# Patient Record
Sex: MALE
[De-identification: names, ages, dates, MRNs, and addresses within clinical notes are randomized per-mention and may not be internally consistent; named-entity substitution may affect disease eponyms.]

---

## 2022-05-11 PROBLEM — Z00.00 ENCOUNTER FOR PREVENTIVE HEALTH EXAMINATION: Status: ACTIVE | Noted: 2022-05-11

## 2022-05-12 ENCOUNTER — APPOINTMENT (OUTPATIENT)
Dept: SURGERY | Facility: CLINIC | Age: 67
End: 2022-05-12
Payer: MEDICARE

## 2022-05-12 VITALS
TEMPERATURE: 98.8 F | HEART RATE: 62 BPM | DIASTOLIC BLOOD PRESSURE: 77 MMHG | HEIGHT: 67 IN | SYSTOLIC BLOOD PRESSURE: 147 MMHG | BODY MASS INDEX: 27.94 KG/M2 | OXYGEN SATURATION: 96 % | WEIGHT: 178 LBS

## 2022-05-12 PROCEDURE — 99204 OFFICE O/P NEW MOD 45 MIN: CPT

## 2022-06-24 ENCOUNTER — APPOINTMENT (OUTPATIENT)
Dept: SURGERY | Facility: CLINIC | Age: 67
End: 2022-06-24
Payer: MEDICARE

## 2022-06-24 VITALS — HEART RATE: 59 BPM | DIASTOLIC BLOOD PRESSURE: 87 MMHG | SYSTOLIC BLOOD PRESSURE: 144 MMHG | OXYGEN SATURATION: 98 %

## 2022-06-24 VITALS
HEART RATE: 56 BPM | SYSTOLIC BLOOD PRESSURE: 152 MMHG | BODY MASS INDEX: 27.94 KG/M2 | TEMPERATURE: 98.2 F | OXYGEN SATURATION: 98 % | WEIGHT: 178 LBS | DIASTOLIC BLOOD PRESSURE: 79 MMHG | HEIGHT: 67 IN

## 2022-06-24 PROCEDURE — 11404 EXC TR-EXT B9+MARG 3.1-4 CM: CPT

## 2022-06-24 NOTE — PROCEDURE
[FreeTextEntry1] : Procedure: Excision of Back Mass\par Detail: After informed consent was obtained, the back was prepped and draped in sterile manner.  Using 1% lidocaine, the back was anesthetized.  Using a elliptical incision measuring 5 cm skin incision was made.  Mass with sebaceum inside was removed entirely.  Upon obtaining hemostasis, closure was begun.  Using 2-0 Vicryl sutures the skin was approximated.  After that, 3-0 Vicryl was used to approximate the skin further.  4-0 Monocryl was then used to clip close the skin further.  Steri-Strip and sterile dressing was placed over the wound.  The patient tolerated procedure well.

## 2022-07-07 LAB — CORE LAB BIOPSY: NORMAL

## 2022-07-14 ENCOUNTER — APPOINTMENT (OUTPATIENT)
Dept: SURGERY | Facility: CLINIC | Age: 67
End: 2022-07-14

## 2022-07-14 VITALS
HEART RATE: 54 BPM | SYSTOLIC BLOOD PRESSURE: 117 MMHG | HEIGHT: 67 IN | OXYGEN SATURATION: 96 % | DIASTOLIC BLOOD PRESSURE: 64 MMHG | BODY MASS INDEX: 28.16 KG/M2 | TEMPERATURE: 98.2 F | WEIGHT: 179.44 LBS

## 2022-07-14 DIAGNOSIS — L72.3 SEBACEOUS CYST: ICD-10-CM

## 2022-07-14 PROCEDURE — 99212 OFFICE O/P EST SF 10 MIN: CPT

## 2022-07-14 NOTE — HISTORY OF PRESENT ILLNESS
[de-identified] : The patient was seen and examined.  Status post excision of back mass.  Pathology revealed sebaceous cyst.  Was advised to come back as needed.

## 2023-07-09 ENCOUNTER — TRANSCRIPTION ENCOUNTER (OUTPATIENT)
Age: 68
End: 2023-07-09

## 2025-07-17 ENCOUNTER — EMERGENCY (EMERGENCY)
Facility: HOSPITAL | Age: 70
LOS: 0 days | Discharge: HOME | End: 2025-07-17
Attending: EMERGENCY MEDICINE
Payer: MEDICARE

## 2025-07-17 VITALS
DIASTOLIC BLOOD PRESSURE: 81 MMHG | HEART RATE: 70 BPM | SYSTOLIC BLOOD PRESSURE: 151 MMHG | RESPIRATION RATE: 18 BRPM | TEMPERATURE: 99 F | OXYGEN SATURATION: 96 %

## 2025-07-17 DIAGNOSIS — T63.441A TOXIC EFFECT OF VENOM OF BEES, ACCIDENTAL (UNINTENTIONAL), INITIAL ENCOUNTER: ICD-10-CM

## 2025-07-17 DIAGNOSIS — R51.9 HEADACHE, UNSPECIFIED: ICD-10-CM

## 2025-07-17 DIAGNOSIS — R22.0 LOCALIZED SWELLING, MASS AND LUMP, HEAD: ICD-10-CM

## 2025-07-17 PROCEDURE — 99285 EMERGENCY DEPT VISIT HI MDM: CPT | Mod: FS

## 2025-07-17 RX ORDER — DIPHENHYDRAMINE HCL 12.5MG/5ML
1 ELIXIR ORAL
Qty: 8 | Refills: 0
Start: 2025-07-17 | End: 2025-07-18

## 2025-07-17 RX ORDER — DIPHENHYDRAMINE HCL 12.5MG/5ML
50 ELIXIR ORAL ONCE
Refills: 0 | Status: COMPLETED | OUTPATIENT
Start: 2025-07-17 | End: 2025-07-17

## 2025-07-17 RX ORDER — PREDNISONE 20 MG/1
2 TABLET ORAL
Qty: 6 | Refills: 0
Start: 2025-07-17 | End: 2025-07-19

## 2025-07-17 RX ORDER — PREDNISONE 20 MG/1
40 TABLET ORAL ONCE
Refills: 0 | Status: COMPLETED | OUTPATIENT
Start: 2025-07-17 | End: 2025-07-17

## 2025-07-17 RX ORDER — AMLODIPINE BESYLATE 10 MG/1
0 TABLET ORAL
Refills: 0 | DISCHARGE

## 2025-07-17 RX ADMIN — Medication 50 MILLIGRAM(S): at 14:43

## 2025-07-17 RX ADMIN — PREDNISONE 40 MILLIGRAM(S): 20 TABLET ORAL at 14:43

## 2025-07-17 NOTE — ED PROVIDER NOTE - ATTENDING APP SHARED VISIT CONTRIBUTION OF CARE
Attending note (Reza): Patient seen and evaluated initially by myself, with the my care plan instituted by the advanced care practitioner as detailed above in the medical decision making section. See MDM or progress notes sections for updates to this care plan.  I have reviewed and agree with any additional documentation by THAIS Aldana. Patient with likely allergic reaction to bee sting versus local edema/inflammatory reaction no internal or pharyngeal edema on visual inspection no other signs or symptoms of rash or other systemic signs to suggest anaphylaxis.  Agree with discharge plan as above.

## 2025-07-17 NOTE — ED PROVIDER NOTE - NSFOLLOWUPINSTRUCTIONS_ED_ALL_ED_FT
Allergic Reaction    An allergic reaction is an abnormal reaction to a substance (allergen) by the body's defense system. Common allergens include medicines, food, insect bites or stings, and blood products. The body releases certain proteins into the blood that can cause a variety of symptoms such as an itchy rash, wheezing, swelling of the face/lips/tongue/throat, abdominal pain, nausea or vomiting. An allergic reaction is usually treated with medication. If your health care provider prescribed you an epinephrine injection device, make sure to keep it with you at all times.    SEEK IMMEDIATE MEDICAL CARE IF YOU HAVE ANY OF THE FOLLOWING SYMPTOMS: allergic reaction severe enough that required you to use epinephrine, tightness in your chest, swelling around your lips/tongue/throat, abdominal pain, vomiting or diarrhea, or lightheadedness/dizziness. These symptoms may represent a serious problem that is an emergency. Do not wait to see if the symptoms will go away. Use your auto-injector pen or anaphylaxis kit as you have been instructed. Call 911 and do not drive yourself to the hospital.     Take prednisone 40mg (two tablets) once daily for the next 3 days starting tomorrow. Allergic Reaction    An allergic reaction is an abnormal reaction to a substance (allergen) by the body's defense system. Common allergens include medicines, food, insect bites or stings, and blood products. The body releases certain proteins into the blood that can cause a variety of symptoms such as an itchy rash, wheezing, swelling of the face/lips/tongue/throat, abdominal pain, nausea or vomiting. An allergic reaction is usually treated with medication. If your health care provider prescribed you an epinephrine injection device, make sure to keep it with you at all times.    SEEK IMMEDIATE MEDICAL CARE IF YOU HAVE ANY OF THE FOLLOWING SYMPTOMS: allergic reaction severe enough that required you to use epinephrine, tightness in your chest, swelling around your lips/tongue/throat, abdominal pain, vomiting or diarrhea, or lightheadedness/dizziness. These symptoms may represent a serious problem that is an emergency. Do not wait to see if the symptoms will go away. Use your auto-injector pen or anaphylaxis kit as you have been instructed. Call 911 and do not drive yourself to the hospital.     Take prednisone 40mg (two tablets) once daily for the next 3 days starting tomorrow.    A prescription for Benadryl and EpiPen was sent to the pharmacy. Read all instructions and take as directed. EpiPen should only be used in the setting on anaphylaxis.    Anaphylaxis    An anaphylactic reaction (anaphylaxis) is a sudden, severe allergic reaction that affects multiple areas of the body. An allergic reaction is an abnormal reaction to a substance (allergen) by the body's defense system. Common allergens include medicines, food, insect bites or stings, and blood products. The body releases certain proteins into the blood that can cause a variety of symptoms such as an itchy rash, wheezing, swelling of the face/lips/tongue/throat, abdominal pain, nausea or vomiting. An allergic reaction is usually treated with medication. If your health care provider prescribed you an epinephrine injection device, make sure to keep it with you at all times.    SEEK IMMEDIATE MEDICAL CARE IF YOU HAVE ANY OF THE FOLLOWING SYMPTOMS: allergic reaction severe enough that required you to use epinephrine, tightness in your chest, swelling around your lips/tongue/throat, abdominal pain, vomiting or diarrhea, or lightheadedness/dizziness. These symptoms may represent a serious problem that is an emergency. Do not wait to see if the symptoms will go away. Use your auto-injector pen or anaphylaxis kit as you have been instructed. Call 911 and do not drive yourself to the hospital.    Advance activity as tolerated.      Continue all previously prescribed medications as directed unless otherwise instructed.      Follow up with your primary care physician in 48-72 hours- bring copies of your results.

## 2025-07-17 NOTE — ED PROVIDER NOTE - NS ED ROS FT
Review of Systems:    -  Constitutional: Denies fever, chills, or fatigue    -  HEENT: Reports swelling at bee sting sites on right side of face and both lips    -  Respiratory: Denies difficulty breathing    -  Gastrointestinal: Denies nausea or difficulty swallowing    -  Skin: Denies rashes or itching    -  All other systems reviewed and negative

## 2025-07-17 NOTE — ED PROVIDER NOTE - CLINICAL SUMMARY MEDICAL DECISION MAKING FREE TEXT BOX
Attending note (Reza): Differential Diagnosis:    - Localized allergic reaction to bee sting, Anaphylaxis, Angioedema     Assessment and Plan:  Allergic reaction to bee sting    - Assessment/Plan: I assess this as a localized allergic reaction to bee stings based on the patient's history of bee allergies, the presence of localized swelling at the sting sites, and lip swelling without systemic symptoms. The absence of difficulty breathing, widespread rash, or other systemic symptoms makes anaphylaxis less likely at this time.      - Administer Benadryl (diphenhydramine) in the ED for immediate symptom relief       - Prescribe a short course of oral prednisone to prevent rebound swelling       - Observe the patient in the ED for a short period to ensure symptom improvement

## 2025-07-17 NOTE — ED ADULT NURSE NOTE - OBJECTIVE STATEMENT
As per pt he was stung by a bee 30 minutes ago, upper lip swollen. No diff breathing. no CP PMH HTN hyperlipidemia

## 2025-07-17 NOTE — ED PROVIDER NOTE - OBJECTIVE STATEMENT
Attending note (Reza): History of Present Illness: 70 year old male presented to the Emergency Department with a chief complaint of two bee stings on the right side of his face. The incident occurred just prior to his arrival. He reports pain and swelling at the sting sites. Additionally, he notes swelling of both lips. The patient denies any rashes, itching, trouble breathing, or swallowing difficulties. He reports a history of bee allergies, recalling a similar incident in the past where he experienced lip swelling. The patient denies any nausea or other associated symptoms.    - History: The patient reports a history of bee allergies. He denies any history of diabetes or other significant medical history was provided during the encounter.

## 2025-07-17 NOTE — ED ADULT TRIAGE NOTE - CHIEF COMPLAINT QUOTE
pt states he was stung by a bee twice 20 minutes ago. c/o slight upper lip swelling. denies shortness of breath or tongue swelling. history of htn and high cholesterol

## 2025-07-17 NOTE — ED PROVIDER NOTE - PROGRESS NOTE DETAILS
RW DANETTE Aldana: Patient reports improvement of swelling, denies throat closing, rash, vomiting or diarrhea.  No evidence anaphylaxis at this time.  Patient does not have an EpiPen pain, will send EpiPen, steroids and Benadryl to the pharmacy.  Strict return precautions provided, heart lungs normal on exam, well-appearing in no acute distress, patient is safe to discharge

## 2025-07-17 NOTE — ED PROVIDER NOTE - PHYSICAL EXAMINATION
Physical Examination (PE):      - General: Alert and oriented      - HEENT: Visible swelling noted on right side of face at sting sites. Both lips appear swollen      - Oropharynx: no edema of tongue, floor of mouth or posterior pharynx      - Neck: No pain on palpation      - Respiratory: Clear breath sounds on auscultation      - Cardiac: normal s1s2/regular; radial pulses equal bilaterally      - Skin: No visible rashes noted

## 2025-07-17 NOTE — ED PROVIDER NOTE - PATIENT PORTAL LINK FT
You can access the FollowMyHealth Patient Portal offered by [facility] by registering at the following website: [email or web address]. By joining [facility]’s FollowMyHealth portal, you will also be able to view your health information using other applications (apps) compatible with our system.